# Patient Record
Sex: MALE | Race: BLACK OR AFRICAN AMERICAN | Employment: UNEMPLOYED
[De-identification: names, ages, dates, MRNs, and addresses within clinical notes are randomized per-mention and may not be internally consistent; named-entity substitution may affect disease eponyms.]

---

## 2024-09-21 ENCOUNTER — HOSPITAL ENCOUNTER (EMERGENCY)
Facility: HOSPITAL | Age: 29
Discharge: HOME | End: 2024-09-21
Attending: STUDENT IN AN ORGANIZED HEALTH CARE EDUCATION/TRAINING PROGRAM
Payer: MEDICAID

## 2024-09-21 VITALS
WEIGHT: 160 LBS | DIASTOLIC BLOOD PRESSURE: 86 MMHG | OXYGEN SATURATION: 100 % | HEIGHT: 70 IN | SYSTOLIC BLOOD PRESSURE: 147 MMHG | TEMPERATURE: 97.5 F | RESPIRATION RATE: 14 BRPM | HEART RATE: 95 BPM | BODY MASS INDEX: 22.9 KG/M2

## 2024-09-21 DIAGNOSIS — L29.9 PRURITIC DISORDER: Primary | ICD-10-CM

## 2024-09-21 LAB
ALBUMIN SERPL-MCNC: 4.3 G/DL (ref 3.4–5)
ALBUMIN/GLOB SERPL: 1 (ref 0.8–1.7)
ALP SERPL-CCNC: 93 U/L (ref 45–117)
ALT SERPL-CCNC: 37 U/L (ref 16–61)
ANION GAP SERPL CALC-SCNC: 6 MMOL/L (ref 3–18)
AST SERPL-CCNC: 19 U/L (ref 10–38)
BASOPHILS # BLD: 0.1 K/UL (ref 0–0.1)
BASOPHILS NFR BLD: 1 % (ref 0–2)
BILIRUB SERPL-MCNC: 0.7 MG/DL (ref 0.2–1)
BUN SERPL-MCNC: 15 MG/DL (ref 7–18)
BUN/CREAT SERPL: 12 (ref 12–20)
CALCIUM SERPL-MCNC: 9.4 MG/DL (ref 8.5–10.1)
CHLORIDE SERPL-SCNC: 106 MMOL/L (ref 100–111)
CO2 SERPL-SCNC: 28 MMOL/L (ref 21–32)
CREAT SERPL-MCNC: 1.27 MG/DL (ref 0.6–1.3)
DIFFERENTIAL METHOD BLD: ABNORMAL
EOSINOPHIL # BLD: 0.1 K/UL (ref 0–0.4)
EOSINOPHIL NFR BLD: 1 % (ref 0–5)
ERYTHROCYTE [DISTWIDTH] IN BLOOD BY AUTOMATED COUNT: 12.3 % (ref 11.6–14.5)
GLOBULIN SER CALC-MCNC: 4.4 G/DL (ref 2–4)
GLUCOSE SERPL-MCNC: 95 MG/DL (ref 74–99)
HCT VFR BLD AUTO: 43 % (ref 36–48)
HGB BLD-MCNC: 14.6 G/DL (ref 13–16)
IMM GRANULOCYTES # BLD AUTO: 0 K/UL (ref 0–0.04)
IMM GRANULOCYTES NFR BLD AUTO: 0 % (ref 0–0.5)
LYMPHOCYTES # BLD: 1.9 K/UL (ref 0.9–3.6)
LYMPHOCYTES NFR BLD: 40 % (ref 21–52)
MCH RBC QN AUTO: 29.1 PG (ref 24–34)
MCHC RBC AUTO-ENTMCNC: 34 G/DL (ref 31–37)
MCV RBC AUTO: 85.7 FL (ref 78–100)
MONOCYTES # BLD: 0.4 K/UL (ref 0.05–1.2)
MONOCYTES NFR BLD: 9 % (ref 3–10)
NEUTS SEG # BLD: 2.3 K/UL (ref 1.8–8)
NEUTS SEG NFR BLD: 48 % (ref 40–73)
NRBC # BLD: 0 K/UL (ref 0–0.01)
NRBC BLD-RTO: 0 PER 100 WBC
PLATELET # BLD AUTO: 337 K/UL (ref 135–420)
PMV BLD AUTO: 9.1 FL (ref 9.2–11.8)
POTASSIUM SERPL-SCNC: 3.8 MMOL/L (ref 3.5–5.5)
PROT SERPL-MCNC: 8.7 G/DL (ref 6.4–8.2)
RBC # BLD AUTO: 5.02 M/UL (ref 4.35–5.65)
SODIUM SERPL-SCNC: 140 MMOL/L (ref 136–145)
WBC # BLD AUTO: 4.7 K/UL (ref 4.6–13.2)

## 2024-09-21 PROCEDURE — 6360000002 HC RX W HCPCS: Performed by: STUDENT IN AN ORGANIZED HEALTH CARE EDUCATION/TRAINING PROGRAM

## 2024-09-21 PROCEDURE — 80053 COMPREHEN METABOLIC PANEL: CPT

## 2024-09-21 PROCEDURE — 99284 EMERGENCY DEPT VISIT MOD MDM: CPT

## 2024-09-21 PROCEDURE — 96374 THER/PROPH/DIAG INJ IV PUSH: CPT

## 2024-09-21 PROCEDURE — 85025 COMPLETE CBC W/AUTO DIFF WBC: CPT

## 2024-09-21 RX ORDER — ATORVASTATIN CALCIUM 10 MG/1
20 TABLET, FILM COATED ORAL DAILY
COMMUNITY

## 2024-09-21 RX ORDER — DIPHENHYDRAMINE HYDROCHLORIDE 50 MG/ML
25 INJECTION INTRAMUSCULAR; INTRAVENOUS
Status: COMPLETED | OUTPATIENT
Start: 2024-09-21 | End: 2024-09-21

## 2024-09-21 RX ADMIN — DIPHENHYDRAMINE HYDROCHLORIDE 25 MG: 50 INJECTION, SOLUTION INTRAMUSCULAR; INTRAVENOUS at 14:25

## 2024-09-21 ASSESSMENT — LIFESTYLE VARIABLES
HOW MANY STANDARD DRINKS CONTAINING ALCOHOL DO YOU HAVE ON A TYPICAL DAY: PATIENT DOES NOT DRINK
HOW OFTEN DO YOU HAVE A DRINK CONTAINING ALCOHOL: NEVER

## 2024-09-21 ASSESSMENT — PAIN SCALES - GENERAL: PAINLEVEL_OUTOF10: 0

## 2024-09-21 ASSESSMENT — PAIN - FUNCTIONAL ASSESSMENT: PAIN_FUNCTIONAL_ASSESSMENT: NONE - DENIES PAIN

## 2025-06-13 ENCOUNTER — HOSPITAL ENCOUNTER (OUTPATIENT)
Facility: HOSPITAL | Age: 30
Setting detail: SPECIMEN
Discharge: HOME | End: 2025-06-16

## 2025-06-13 ENCOUNTER — OFFICE VISIT (OUTPATIENT)
Facility: CLINIC | Age: 30
End: 2025-06-13
Payer: MEDICAID

## 2025-06-13 VITALS
WEIGHT: 155.4 LBS | OXYGEN SATURATION: 99 % | RESPIRATION RATE: 20 BRPM | HEART RATE: 94 BPM | HEIGHT: 70 IN | DIASTOLIC BLOOD PRESSURE: 80 MMHG | SYSTOLIC BLOOD PRESSURE: 120 MMHG | BODY MASS INDEX: 22.25 KG/M2 | TEMPERATURE: 98.2 F

## 2025-06-13 DIAGNOSIS — M79.605 LEFT LEG PAIN: Primary | ICD-10-CM

## 2025-06-13 DIAGNOSIS — R20.2 PARESTHESIA: ICD-10-CM

## 2025-06-13 PROCEDURE — 99001 SPECIMEN HANDLING PT-LAB: CPT

## 2025-06-13 PROCEDURE — 99214 OFFICE O/P EST MOD 30 MIN: CPT

## 2025-06-13 RX ORDER — LISINOPRIL 5 MG/1
5 TABLET ORAL DAILY
COMMUNITY

## 2025-06-13 RX ORDER — LISDEXAMFETAMINE DIMESYLATE 10 MG/1
10 CAPSULE ORAL EVERY MORNING
COMMUNITY
Start: 2025-06-04

## 2025-06-13 SDOH — ECONOMIC STABILITY: FOOD INSECURITY: WITHIN THE PAST 12 MONTHS, THE FOOD YOU BOUGHT JUST DIDN'T LAST AND YOU DIDN'T HAVE MONEY TO GET MORE.: NEVER TRUE

## 2025-06-13 SDOH — ECONOMIC STABILITY: FOOD INSECURITY: WITHIN THE PAST 12 MONTHS, YOU WORRIED THAT YOUR FOOD WOULD RUN OUT BEFORE YOU GOT MONEY TO BUY MORE.: NEVER TRUE

## 2025-06-13 ASSESSMENT — PATIENT HEALTH QUESTIONNAIRE - PHQ9
SUM OF ALL RESPONSES TO PHQ QUESTIONS 1-9: 0
1. LITTLE INTEREST OR PLEASURE IN DOING THINGS: NOT AT ALL
SUM OF ALL RESPONSES TO PHQ QUESTIONS 1-9: 0
2. FEELING DOWN, DEPRESSED OR HOPELESS: NOT AT ALL

## 2025-06-13 ASSESSMENT — ENCOUNTER SYMPTOMS: SHORTNESS OF BREATH: 0

## 2025-06-13 NOTE — PROGRESS NOTES
Chief Complaint   Patient presents with    Leg Pain     Patient states that he has been having poor circulation in his left leg.        Assessment & Plan:     1. Left leg pain  Comments:  orders also faxed to vascular for scheduling  Orders:  -     Vascular duplex lower extremity venous left; Future  2. Paresthesia  -     Vitamin B12 & Folate; Future  -     Vascular duplex lower extremity venous left; Future  -     Magnesium; Future      Follow-up and Dispositions    Return in about 2 weeks (around 6/27/2025) for Discuss Lab Results.         Subjective:     HPI      Leg Pain:  Pt c/o sensation of left leg pain and numbness and sensation of \"falling asleep\"  Onset: 3/2025 pt states resolved and recently returned approx 1 week ago. Pt states he was running (exercise) and experienced left leg and foot burning and tingling sensation. Pt reports hx of varicose veins   Location: left leg  Duration: intermittent   Characteristics:burning and tingling sensation isolated   Associated symptoms:  Aggravating factors:  Relieving factors:  Treatment: denies   Pt states was previously tested in March 2025 and magnesium test was normal. Pt states he was evaluated in a hospital in [place]. (Pt will upload labs from [place]).       ntains abnormal data Comprehensive Metabolic Panel  Specimen: Blood - Blood (substance)  Component  Ref Range & Units 2 mo ago Comments   Potassium  3.5 - 5.5 mmol/L 4.6    Sodium  133 - 145 mmol/L 139    Chloride  98 - 110 mmol/L 100    Glucose  70 - 99 mg/dL 87    Calcium  8.4 - 10.5 mg/dL 10.2    Albumin  3.5 - 5.0 g/dL 5.1 High     SGPT (ALT)  5 - 40 U/L 22    SGOT (AST)  10 - 37 U/L 20    Bilirubin Total  0.2 - 1.2 mg/dL 0.6    Alkaline Phosphatase  25 - 115 U/L 79    BUN  6 - 22 mg/dL 10    CO2  20 - 32 mmol/L 28    Creatinine  0.5 - 1.2 mg/dL 1.2    eGFR  >60.0 mL/min/1.73 sq.m. >60.0 eGFR calculation based on the Chronic Kidney Disease Epidemiology Collaboration (CKD-EPI) equation refit without

## 2025-06-14 LAB
FOLATE: 12.3 NG/ML
MAGNESIUM: 2.1 MG/DL (ref 1.6–2.5)
VITAMIN B-12: 988 PG/ML (ref 211–911)

## 2025-06-15 LAB — SENTARA SPECIMEN COLLECTION: NORMAL

## 2025-06-30 ENCOUNTER — HOSPITAL ENCOUNTER (OUTPATIENT)
Age: 30
Discharge: HOME | End: 2025-07-02
Payer: MEDICAID

## 2025-06-30 DIAGNOSIS — M79.605 LEFT LEG PAIN: ICD-10-CM

## 2025-06-30 DIAGNOSIS — R20.2 PARESTHESIA: ICD-10-CM

## 2025-06-30 PROCEDURE — 93971 EXTREMITY STUDY: CPT | Performed by: SURGERY

## 2025-06-30 PROCEDURE — 93971 EXTREMITY STUDY: CPT

## 2025-07-15 ENCOUNTER — OFFICE VISIT (OUTPATIENT)
Facility: CLINIC | Age: 30
End: 2025-07-15
Payer: MEDICAID

## 2025-07-15 VITALS
HEIGHT: 70 IN | HEART RATE: 90 BPM | OXYGEN SATURATION: 98 % | BODY MASS INDEX: 22.68 KG/M2 | WEIGHT: 158.4 LBS | TEMPERATURE: 98.3 F | SYSTOLIC BLOOD PRESSURE: 128 MMHG | RESPIRATION RATE: 12 BRPM | DIASTOLIC BLOOD PRESSURE: 84 MMHG

## 2025-07-15 DIAGNOSIS — F32.A DEPRESSION, UNSPECIFIED DEPRESSION TYPE: ICD-10-CM

## 2025-07-15 DIAGNOSIS — M79.605 LEFT LEG PAIN: ICD-10-CM

## 2025-07-15 DIAGNOSIS — R20.2 PARESTHESIA: ICD-10-CM

## 2025-07-15 DIAGNOSIS — E78.5 HYPERLIPIDEMIA WITH TARGET LDL LESS THAN 100: ICD-10-CM

## 2025-07-15 DIAGNOSIS — I10 ESSENTIAL HYPERTENSION: Primary | ICD-10-CM

## 2025-07-15 PROCEDURE — 3079F DIAST BP 80-89 MM HG: CPT

## 2025-07-15 PROCEDURE — 99213 OFFICE O/P EST LOW 20 MIN: CPT

## 2025-07-15 PROCEDURE — 3074F SYST BP LT 130 MM HG: CPT

## 2025-07-15 RX ORDER — LISINOPRIL 5 MG/1
5 TABLET ORAL DAILY
Qty: 30 TABLET | Refills: 2 | Status: SHIPPED | OUTPATIENT
Start: 2025-07-15

## 2025-07-15 ASSESSMENT — ENCOUNTER SYMPTOMS: SHORTNESS OF BREATH: 0

## 2025-07-15 NOTE — PROGRESS NOTES
Nghia Sr (:  1995) is a 29 y.o. male, Established patient, here for evaluation of the following chief complaint(s):  Discuss Labs, Hypertension, Hyperlipidemia, and Depression    Assessment & Plan  1. Essential hypertension   Currently on lisinopril 5 mg for high blood pressure.  - Prescription refill for lisinopril 5 mg sent to pharmacy.  - Follow-up visit scheduled for 2025 to recheck kidney function, liver function, and cholesterol levels.  -     lisinopril (PRINIVIL;ZESTRIL) 5 MG tablet; Take 1 tablet by mouth daily, Disp-30 tablet, R-2Normal  -     CBC with Auto Differential; Future  -     Comprehensive Metabolic Panel; Future  2. Paresthesia and Left leg pain  Pt reports sx resolved.   - B12 levels elevated at 988, possibly due to intake of B vitamins or supplements  - Magnesium level normal at 2.1; folate level normal at 12.3.  - Doppler study of left lower leg showed no signs of deep vein thrombosis; circulation adequate.  - Advised to maintain hydration with at least 64 ounces of water daily; perform muscle stretching exercises post prolonged standing or walking; recommended daily men's multivitamin.    3. Hyperlipidemia with target LDL less than 100  -     Lipid Panel; Future  - Currently taking atorvastatin for cholesterol management; no refills needed at this time.  - Cholesterol levels to be rechecked during next visit in 2025.    4. Depression.  - Currently taking bupropion (Wellbutrin); reports doing well on it; no refills needed at this time.  - Reports no symptoms of depression, anxiety, or thoughts of self-harm.    Results  Labs   - B12 level: 988   - Magnesium level: 2.1   - Folate level: 12.3    Imaging   - Doppler study of left lower leg: No evidence of deep vein thrombosis and adequate flow to lower extremities    Return in about 2 months (around 9/15/2025) for Well Male Visit, HLD, Fasting labs 1 Week Prior.       Subjective   History of Present Illness  The patient

## 2025-07-15 NOTE — PROGRESS NOTES
Nghia Sr is a 29 y.o. male (: 1995) presenting to address:    Chief Complaint   Patient presents with    Discuss Labs       There were no vitals filed for this visit.    \"Have you been to the ER, urgent care clinic since your last visit?  Hospitalized since your last visit?\"    NO    “Have you seen or consulted any other health care providers outside of [facility] since your last visit?”    NO

## 2025-08-27 ENCOUNTER — HOSPITAL ENCOUNTER (OUTPATIENT)
Facility: HOSPITAL | Age: 30
Setting detail: SPECIMEN
Discharge: HOME | End: 2025-08-30

## 2025-08-27 ENCOUNTER — LAB (OUTPATIENT)
Facility: CLINIC | Age: 30
End: 2025-08-27

## 2025-08-27 DIAGNOSIS — E78.5 HYPERLIPIDEMIA WITH TARGET LDL LESS THAN 100: ICD-10-CM

## 2025-08-27 DIAGNOSIS — I10 ESSENTIAL HYPERTENSION: ICD-10-CM

## 2025-08-27 LAB — SENTARA SPECIMEN COLLECTION: NORMAL

## 2025-08-27 PROCEDURE — 99001 SPECIMEN HANDLING PT-LAB: CPT

## 2025-08-28 ENCOUNTER — RESULTS FOLLOW-UP (OUTPATIENT)
Facility: CLINIC | Age: 30
End: 2025-08-28

## 2025-08-28 LAB
A/G RATIO: 1.7 RATIO (ref 1.1–2.6)
ALBUMIN: 4.7 G/DL (ref 3.5–5)
ALP BLD-CCNC: 63 U/L (ref 25–115)
ALT SERPL-CCNC: 44 U/L (ref 5–40)
ANION GAP SERPL CALCULATED.3IONS-SCNC: 12 MMOL/L (ref 3–15)
AST SERPL-CCNC: 39 U/L (ref 10–37)
BASOPHILS # BLD: 1 % (ref 0–2)
BASOPHILS ABSOLUTE: 0 K/UL (ref 0–0.2)
BILIRUB SERPL-MCNC: 0.6 MG/DL (ref 0.2–1.2)
BUN BLDV-MCNC: 20 MG/DL (ref 6–22)
CALCIUM SERPL-MCNC: 9.6 MG/DL (ref 8.4–10.5)
CHLORIDE BLD-SCNC: 101 MMOL/L (ref 98–110)
CHOLESTEROL, TOTAL: 145 MG/DL (ref 110–200)
CHOLESTEROL/HDL RATIO: 3.1 (ref 0–5)
CO2: 24 MMOL/L (ref 20–32)
CREAT SERPL-MCNC: 1.1 MG/DL (ref 0.5–1.2)
EOSINOPHIL # BLD: 2 % (ref 0–6)
EOSINOPHILS ABSOLUTE: 0.1 K/UL (ref 0–0.5)
GFR, ESTIMATED: 87.5 ML/MIN/1.73 SQ.M.
GLOBULIN: 2.8 G/DL (ref 2–4)
GLUCOSE: 77 MG/DL (ref 70–99)
HCT VFR BLD CALC: 42 % (ref 36.6–51.9)
HDLC SERPL-MCNC: 47 MG/DL
HEMOGLOBIN: 13.4 G/DL (ref 13.2–17.3)
LDL CHOLESTEROL: 88 MG/DL (ref 50–99)
LDL/HDL RATIO: 1.9
LYMPHOCYTES # BLD: 50 % (ref 20–45)
LYMPHOCYTES ABSOLUTE: 2.2 K/UL (ref 1–4.8)
MCH RBC QN AUTO: 29 PG (ref 26–34)
MCHC RBC AUTO-ENTMCNC: 32 G/DL (ref 31–36)
MCV RBC AUTO: 90 FL (ref 80–95)
MONOCYTES ABSOLUTE: 0.5 K/UL (ref 0.1–1)
MONOCYTES: 10 % (ref 3–12)
NEUTROPHILS ABSOLUTE: 1.7 K/UL (ref 1.8–7.7)
NEUTROPHILS SEGMENTED: 37 % (ref 40–75)
NON-HDL CHOLESTEROL: 98 MG/DL
PDW BLD-RTO: 12.5 % (ref 10–15.5)
PLATELET # BLD: 277 K/UL (ref 140–440)
PMV BLD AUTO: 10.1 FL (ref 9–13)
POTASSIUM SERPL-SCNC: 4.6 MMOL/L (ref 3.5–5.5)
RBC # BLD: 4.65 M/UL (ref 3.8–5.8)
SODIUM BLD-SCNC: 137 MMOL/L (ref 133–145)
TOTAL PROTEIN: 7.5 G/DL (ref 6.4–8.3)
TRIGL SERPL-MCNC: 49 MG/DL (ref 40–149)
VLDLC SERPL CALC-MCNC: 10 MG/DL (ref 8–30)
WBC # BLD: 4.5 K/UL (ref 4–11)